# Patient Record
Sex: MALE | Race: WHITE | NOT HISPANIC OR LATINO | Employment: OTHER | URBAN - METROPOLITAN AREA
[De-identification: names, ages, dates, MRNs, and addresses within clinical notes are randomized per-mention and may not be internally consistent; named-entity substitution may affect disease eponyms.]

---

## 2023-07-14 LAB
CHOLESTEROL (MG/DL) IN SER/PLAS: 105 MG/DL (ref 0–199)
CHOLESTEROL IN HDL (MG/DL) IN SER/PLAS: 41.6 MG/DL
CHOLESTEROL/HDL RATIO: 2.5
LDL: 51 MG/DL (ref 0–99)
TRIGLYCERIDE (MG/DL) IN SER/PLAS: 63 MG/DL (ref 0–149)
VLDL: 13 MG/DL (ref 0–40)

## 2023-12-13 ENCOUNTER — APPOINTMENT (OUTPATIENT)
Dept: CARDIOLOGY | Facility: CLINIC | Age: 68
End: 2023-12-13
Payer: MEDICARE

## 2024-01-09 PROBLEM — R07.89 CHEST PAIN, ATYPICAL: Status: ACTIVE | Noted: 2024-01-09

## 2024-01-09 PROBLEM — E78.5 HYPERLIPIDEMIA: Status: ACTIVE | Noted: 2024-01-09

## 2024-01-09 PROBLEM — H90.3 ASYMMETRICAL SENSORINEURAL HEARING LOSS: Status: ACTIVE | Noted: 2024-01-09

## 2024-01-09 PROBLEM — I25.10 CORONARY ARTERY CALCIFICATION: Status: ACTIVE | Noted: 2024-01-09

## 2024-01-09 PROBLEM — N62 GYNECOMASTIA: Status: ACTIVE | Noted: 2024-01-09

## 2024-01-09 PROBLEM — R91.1 LUNG NODULE: Status: ACTIVE | Noted: 2024-01-09

## 2024-01-09 PROBLEM — K63.5 COLON POLYPS: Status: ACTIVE | Noted: 2024-01-09

## 2024-01-09 PROBLEM — I25.84 CORONARY ARTERY CALCIFICATION: Status: ACTIVE | Noted: 2024-01-09

## 2024-01-09 PROBLEM — R00.2 PALPITATIONS: Status: ACTIVE | Noted: 2024-01-09

## 2024-01-09 PROBLEM — R42 EPISODIC LIGHTHEADEDNESS: Status: ACTIVE | Noted: 2024-01-09

## 2024-01-09 PROBLEM — I10 ESSENTIAL HYPERTENSION: Status: ACTIVE | Noted: 2024-01-09

## 2024-01-09 PROBLEM — H93.12 TINNITUS OF LEFT EAR: Status: ACTIVE | Noted: 2024-01-09

## 2024-01-17 ENCOUNTER — APPOINTMENT (OUTPATIENT)
Dept: CARDIOLOGY | Facility: CLINIC | Age: 69
End: 2024-01-17
Payer: MEDICARE

## 2024-02-07 ENCOUNTER — APPOINTMENT (OUTPATIENT)
Dept: CARDIOLOGY | Facility: CLINIC | Age: 69
End: 2024-02-07
Payer: MEDICARE

## 2024-02-07 RX ORDER — LEVOTHYROXINE SODIUM 50 UG/1
50 TABLET ORAL DAILY
COMMUNITY
Start: 2016-11-03

## 2024-02-07 RX ORDER — VITAMIN B COMPLEX
CAPSULE ORAL
COMMUNITY
Start: 2022-03-10

## 2024-02-07 RX ORDER — NEOMYCIN SULFATE, POLYMYXIN B SULFATE, HYDROCORTISONE 3.5; 10000; 1 MG/ML; [USP'U]/ML; MG/ML
3 SOLUTION/ DROPS AURICULAR (OTIC) EVERY 6 HOURS
COMMUNITY
Start: 2022-08-17

## 2024-02-07 RX ORDER — METHYLPREDNISOLONE 4 MG/1
4 TABLET ORAL ONCE
COMMUNITY
Start: 2023-12-01 | End: 2024-03-13 | Stop reason: WASHOUT

## 2024-02-07 RX ORDER — ROSUVASTATIN CALCIUM 20 MG/1
10 TABLET, COATED ORAL
COMMUNITY
Start: 2019-01-24

## 2024-02-07 RX ORDER — ASCORBIC ACID 500 MG
500 TABLET ORAL DAILY
COMMUNITY
Start: 2022-03-10

## 2024-02-07 RX ORDER — LISINOPRIL 20 MG/1
1 TABLET ORAL DAILY
COMMUNITY
Start: 2016-03-11

## 2024-02-07 RX ORDER — AMLODIPINE BESYLATE 10 MG/1
10 TABLET ORAL
COMMUNITY
Start: 2015-03-30

## 2024-02-07 RX ORDER — LUTEIN 6 MG
TABLET ORAL
COMMUNITY
Start: 2022-03-10

## 2024-02-07 RX ORDER — ASPIRIN 81 MG/1
81 TABLET ORAL
COMMUNITY
Start: 2021-05-13

## 2024-03-07 PROBLEM — E66.3 OVERWEIGHT: Status: ACTIVE | Noted: 2024-03-07

## 2024-03-07 PROBLEM — R06.02 SHORTNESS OF BREATH: Status: ACTIVE | Noted: 2024-03-07

## 2024-03-07 PROBLEM — H61.20 IMPACTED CERUMEN: Status: ACTIVE | Noted: 2024-03-07

## 2024-03-13 ENCOUNTER — OFFICE VISIT (OUTPATIENT)
Dept: CARDIOLOGY | Facility: CLINIC | Age: 69
End: 2024-03-13
Payer: MEDICARE

## 2024-03-13 VITALS
DIASTOLIC BLOOD PRESSURE: 62 MMHG | WEIGHT: 210 LBS | BODY MASS INDEX: 32.89 KG/M2 | HEART RATE: 76 BPM | SYSTOLIC BLOOD PRESSURE: 105 MMHG | OXYGEN SATURATION: 95 %

## 2024-03-13 DIAGNOSIS — I10 ESSENTIAL HYPERTENSION: ICD-10-CM

## 2024-03-13 DIAGNOSIS — E78.00 PURE HYPERCHOLESTEROLEMIA: ICD-10-CM

## 2024-03-13 DIAGNOSIS — I25.10 CORONARY ARTERY CALCIFICATION: Primary | ICD-10-CM

## 2024-03-13 DIAGNOSIS — I25.84 CORONARY ARTERY CALCIFICATION: Primary | ICD-10-CM

## 2024-03-13 PROCEDURE — 99213 OFFICE O/P EST LOW 20 MIN: CPT | Performed by: INTERNAL MEDICINE

## 2024-03-13 PROCEDURE — 3078F DIAST BP <80 MM HG: CPT | Performed by: INTERNAL MEDICINE

## 2024-03-13 PROCEDURE — 1036F TOBACCO NON-USER: CPT | Performed by: INTERNAL MEDICINE

## 2024-03-13 PROCEDURE — 3074F SYST BP LT 130 MM HG: CPT | Performed by: INTERNAL MEDICINE

## 2024-03-13 PROCEDURE — 1160F RVW MEDS BY RX/DR IN RCRD: CPT | Performed by: INTERNAL MEDICINE

## 2024-03-13 PROCEDURE — 1159F MED LIST DOCD IN RCRD: CPT | Performed by: INTERNAL MEDICINE

## 2024-03-13 NOTE — PROGRESS NOTES
Chief Complaint:   Follow-up (Patient is here for a 6 month follow up)     History Of Present Illness:    Hawk Andrews is a 69 y.o. male presenting with cardiovascular disease.   CP  has resolved.  Patient denies SOB/palpitations/dizziness/lightheadedness/edema/claudication  Active=walks/runs    Last Recorded Vitals:  Vitals:    03/13/24 1006 03/13/24 1038   BP: 129/77 105/62   BP Location: Right arm    Patient Position: Sitting    BP Cuff Size: Adult    Pulse: 76    SpO2: 95%    Weight: 95.3 kg (210 lb)             Allergies:  Patient has no known allergies.    Outpatient Medications:  Current Outpatient Medications   Medication Instructions    amLODIPine (NORVASC) 10 mg, oral, Daily RT    ascorbic acid (VITAMIN C) 500 mg, oral, Daily    aspirin 81 mg, oral, Daily RT    b complex vitamins capsule oral    levothyroxine (SYNTHROID) 50 mcg, oral, Daily    lisinopril 20 mg tablet 1 tablet, oral, Daily    neomycin-polymyxin-HC (Cortisporin) otic solution 3 drops, Each Ear, Every 6 hours    rosuvastatin (CRESTOR) 10 mg, oral, Daily RT    vitamin E acid succinate (vitamin E succinate) 268 mg (400 unit) tablet oral       Physical Exam:  Constitutional:       Appearance: Healthy appearance. Overweight and not in distress.   Neck:      Vascular: No JVR. JVD normal.   Pulmonary:      Effort: Pulmonary effort is normal.      Breath sounds: Normal breath sounds. No wheezing. No rhonchi. No rales.   Chest:      Chest wall: Not tender to palpatation.   Cardiovascular:      PMI at left midclavicular line. Normal rate. Regular rhythm. Normal S1. Normal S2.       Murmurs: There is no murmur.      No gallop.  No click. No rub.   Pulses:     Intact distal pulses.   Edema:     Peripheral edema absent.   Abdominal:      General: Bowel sounds are normal.      Palpations: Abdomen is soft.      Tenderness: There is no abdominal tenderness.   Musculoskeletal: Normal range of motion.         General: No tenderness. Skin:     General:  "Skin is warm and dry.   Neurological:      General: No focal deficit present.      Mental Status: Alert and oriented to person, place and time.          Last Labs:  CBC -  No results found for: \"WBC\", \"HGB\", \"HCT\", \"MCV\", \"PLT\"    CMP -  Lab Results   Component Value Date    CALCIUM 9.1 08/30/2022       LIPID PANEL -   Lab Results   Component Value Date    CHOL 105 07/14/2023    TRIG 63 07/14/2023    HDL 41.6 07/14/2023    CHHDL 2.5 07/14/2023    LDLF 51 07/14/2023    VLDL 13 07/14/2023       RENAL FUNCTION PANEL -   Lab Results   Component Value Date    GLUCOSE 102 (H) 08/30/2022     08/30/2022    K 4.1 08/30/2022     08/30/2022    CO2 23 08/30/2022    ANIONGAP 14 08/30/2022    BUN 15 08/30/2022    CREATININE 0.76 08/30/2022    GFRMALE >90 08/30/2022    CALCIUM 9.1 08/30/2022        No results found for: \"BNP\", \"HGBA1C\"        Lab review: I have personally reviewed the laboratory result(s)       Problem List Items Addressed This Visit       Coronary artery calcification - Primary    Overview     CAC = 454 Agatston units units 1/2019   No current angina  9/2023 stress test NL  On ASA / statin           Essential hypertension    Overview     BP on low side -astmptomatic  8/2022 BMP OK           Hyperlipidemia    Overview     On HI statin in light of calcium score   7/2023 lipids good   Follow            Take 81 mg aspirin daily  Weight loss    Robin Farnsworth, DO  "

## 2024-04-03 ENCOUNTER — OFFICE VISIT (OUTPATIENT)
Dept: PRIMARY CARE | Facility: CLINIC | Age: 69
End: 2024-04-03
Payer: MEDICARE

## 2024-04-03 ENCOUNTER — LAB (OUTPATIENT)
Dept: LAB | Facility: LAB | Age: 69
End: 2024-04-03
Payer: MEDICARE

## 2024-04-03 VITALS
SYSTOLIC BLOOD PRESSURE: 130 MMHG | DIASTOLIC BLOOD PRESSURE: 72 MMHG | HEART RATE: 76 BPM | WEIGHT: 211.9 LBS | BODY MASS INDEX: 32.12 KG/M2 | HEIGHT: 68 IN

## 2024-04-03 DIAGNOSIS — Z23 IMMUNIZATION DUE: ICD-10-CM

## 2024-04-03 DIAGNOSIS — E66.09 CLASS 1 OBESITY DUE TO EXCESS CALORIES WITH SERIOUS COMORBIDITY AND BODY MASS INDEX (BMI) OF 32.0 TO 32.9 IN ADULT: ICD-10-CM

## 2024-04-03 DIAGNOSIS — I25.84 CORONARY ARTERY CALCIFICATION: ICD-10-CM

## 2024-04-03 DIAGNOSIS — Z00.00 ROUTINE GENERAL MEDICAL EXAMINATION AT A HEALTH CARE FACILITY: ICD-10-CM

## 2024-04-03 DIAGNOSIS — Z00.00 ROUTINE GENERAL MEDICAL EXAMINATION AT A HEALTH CARE FACILITY: Primary | ICD-10-CM

## 2024-04-03 DIAGNOSIS — I25.10 CORONARY ARTERY CALCIFICATION: ICD-10-CM

## 2024-04-03 PROBLEM — E66.811 CLASS 1 OBESITY DUE TO EXCESS CALORIES WITH SERIOUS COMORBIDITY AND BODY MASS INDEX (BMI) OF 32.0 TO 32.9 IN ADULT: Status: ACTIVE | Noted: 2024-04-03

## 2024-04-03 LAB
ALBUMIN SERPL BCP-MCNC: 4.1 G/DL (ref 3.4–5)
ALP SERPL-CCNC: 68 U/L (ref 33–136)
ALT SERPL W P-5'-P-CCNC: 20 U/L (ref 10–52)
ANION GAP SERPL CALC-SCNC: 15 MMOL/L (ref 10–20)
AST SERPL W P-5'-P-CCNC: 22 U/L (ref 9–39)
BILIRUB SERPL-MCNC: 0.7 MG/DL (ref 0–1.2)
BUN SERPL-MCNC: 16 MG/DL (ref 6–23)
CALCIUM SERPL-MCNC: 10 MG/DL (ref 8.6–10.6)
CHLORIDE SERPL-SCNC: 102 MMOL/L (ref 98–107)
CO2 SERPL-SCNC: 28 MMOL/L (ref 21–32)
CREAT SERPL-MCNC: 0.83 MG/DL (ref 0.5–1.3)
EGFRCR SERPLBLD CKD-EPI 2021: >90 ML/MIN/1.73M*2
ERYTHROCYTE [DISTWIDTH] IN BLOOD BY AUTOMATED COUNT: 13.5 % (ref 11.5–14.5)
EST. AVERAGE GLUCOSE BLD GHB EST-MCNC: 100 MG/DL
GLUCOSE SERPL-MCNC: 91 MG/DL (ref 74–99)
HBA1C MFR BLD: 5.1 %
HCT VFR BLD AUTO: 47.7 % (ref 41–52)
HGB BLD-MCNC: 15.4 G/DL (ref 13.5–17.5)
MCH RBC QN AUTO: 30.1 PG (ref 26–34)
MCHC RBC AUTO-ENTMCNC: 32.3 G/DL (ref 32–36)
MCV RBC AUTO: 93 FL (ref 80–100)
NRBC BLD-RTO: 0 /100 WBCS (ref 0–0)
PLATELET # BLD AUTO: 204 X10*3/UL (ref 150–450)
POTASSIUM SERPL-SCNC: 5.1 MMOL/L (ref 3.5–5.3)
PROT SERPL-MCNC: 6.9 G/DL (ref 6.4–8.2)
PSA SERPL-MCNC: 0.47 NG/ML
RBC # BLD AUTO: 5.11 X10*6/UL (ref 4.5–5.9)
SODIUM SERPL-SCNC: 140 MMOL/L (ref 136–145)
T4 FREE SERPL-MCNC: 1.14 NG/DL (ref 0.78–1.48)
TSH SERPL-ACNC: 4.21 MIU/L (ref 0.44–3.98)
WBC # BLD AUTO: 8.6 X10*3/UL (ref 4.4–11.3)

## 2024-04-03 PROCEDURE — 1160F RVW MEDS BY RX/DR IN RCRD: CPT | Performed by: INTERNAL MEDICINE

## 2024-04-03 PROCEDURE — 3075F SYST BP GE 130 - 139MM HG: CPT | Performed by: INTERNAL MEDICINE

## 2024-04-03 PROCEDURE — 84153 ASSAY OF PSA TOTAL: CPT

## 2024-04-03 PROCEDURE — 90471 IMMUNIZATION ADMIN: CPT | Performed by: INTERNAL MEDICINE

## 2024-04-03 PROCEDURE — 84443 ASSAY THYROID STIM HORMONE: CPT

## 2024-04-03 PROCEDURE — 83036 HEMOGLOBIN GLYCOSYLATED A1C: CPT

## 2024-04-03 PROCEDURE — 3008F BODY MASS INDEX DOCD: CPT | Performed by: INTERNAL MEDICINE

## 2024-04-03 PROCEDURE — 85027 COMPLETE CBC AUTOMATED: CPT

## 2024-04-03 PROCEDURE — 90715 TDAP VACCINE 7 YRS/> IM: CPT | Performed by: INTERNAL MEDICINE

## 2024-04-03 PROCEDURE — 99397 PER PM REEVAL EST PAT 65+ YR: CPT | Performed by: INTERNAL MEDICINE

## 2024-04-03 PROCEDURE — 80053 COMPREHEN METABOLIC PANEL: CPT

## 2024-04-03 PROCEDURE — 84439 ASSAY OF FREE THYROXINE: CPT

## 2024-04-03 PROCEDURE — 1159F MED LIST DOCD IN RCRD: CPT | Performed by: INTERNAL MEDICINE

## 2024-04-03 PROCEDURE — 3078F DIAST BP <80 MM HG: CPT | Performed by: INTERNAL MEDICINE

## 2024-04-03 PROCEDURE — 36415 COLL VENOUS BLD VENIPUNCTURE: CPT

## 2024-04-03 ASSESSMENT — ENCOUNTER SYMPTOMS
DIZZINESS: 0
SHORTNESS OF BREATH: 0
FATIGUE: 0
LOSS OF SENSATION IN FEET: 0
DEPRESSION: 0
OCCASIONAL FEELINGS OF UNSTEADINESS: 0
LIGHT-HEADEDNESS: 0
SLEEP DISTURBANCE: 0

## 2024-04-03 NOTE — PROGRESS NOTES
"Subjective   Patient ID: Hawk Andrews is a 69 y.o. male who presents for No chief complaint on file..    Here to get established after last PCP retired. [facility] Retired from the  in 2013 after working as an .    PMH:  HTN: On amlodipine and lisinopril.  Coronary artery calcification: Follows with cardiology. Had a stress test done that was negative. On ASA and statin. Had stopped taking a baby ASA, but says his cardiologist (Dr. Farnsworth) recommended he resume it recently.    Tries to exercise daily by stretching. Will go on walks, but plans to start biking this summer. Has gained 5-10 lbs over the last year. Doesn't smoke. Drinks beer/wine here and there. Eats non-sugary cereal daily, will sometimes skip lunch, and eats dinner before 6. Mostly just drinks water, with a ginger ale once every week or two or an occasional glass of milk (outside of having it with his morning cereal). Will eat burgers, hot dogs, meatloaf, veggies, a lot of salads (uses oil/vinegar instead of dressings). Likes to grill in the summer.        Review of Systems   Constitutional:  Negative for fatigue.   Respiratory:  Negative for shortness of breath.    Cardiovascular:  Negative for chest pain.   Neurological:  Negative for dizziness and light-headedness.   Psychiatric/Behavioral:  Negative for sleep disturbance.        /72 (BP Location: Right arm, Patient Position: Sitting, BP Cuff Size: Adult)   Pulse 76   Ht 1.73 m (5' 8.11\")   Wt 96.1 kg (211 lb 14.4 oz)   BMI 32.12 kg/m²   Objective   Physical Exam  Constitutional:       General: He is not in acute distress.     Appearance: He is obese. He is not ill-appearing, toxic-appearing or diaphoretic.   HENT:      Head: Normocephalic and atraumatic.   Eyes:      General: No scleral icterus.     Conjunctiva/sclera: Conjunctivae normal.   Cardiovascular:      Rate and Rhythm: Normal rate and regular rhythm.      Heart sounds: No murmur " heard.     No friction rub. No gallop.   Pulmonary:      Effort: Pulmonary effort is normal. No respiratory distress.      Breath sounds: No stridor. No wheezing, rhonchi or rales.   Abdominal:      General: Abdomen is flat. Bowel sounds are normal. There is no distension.      Palpations: Abdomen is soft.      Tenderness: There is no abdominal tenderness. There is no guarding.   Musculoskeletal:      Cervical back: Normal range of motion and neck supple. No tenderness.      Right lower leg: No edema.      Left lower leg: No edema.   Lymphadenopathy:      Cervical: No cervical adenopathy.   Skin:     General: Skin is warm and dry.   Neurological:      Mental Status: He is alert.         Assessment/Plan   Problem List Items Addressed This Visit             ICD-10-CM    Coronary artery calcification I25.10, I25.84    Relevant Orders    CBC    Class 1 obesity due to excess calories with serious comorbidity and body mass index (BMI) of 32.0 to 32.9 in adult E66.09, Z68.32     -Reviewed dietary habits. Recommended to start calorie counting to see if portion size could be the issue. Pt will try this and follow up at next appt.  -Already doing daily stretches and spends time doing water aerobics. Will start exercising/biking more as temperature improves.          Other Visit Diagnoses         Codes    Routine general medical examination at a health care facility    -  Primary Z00.00    Relevant Orders    Comprehensive metabolic panel    PSA    Hemoglobin A1c    Tsh With Reflex To Free T4 If Abnormal    Immunization due     Z23    Relevant Orders    Tdap vaccine, age 7 years and older (Completed)        -Labwork as ordered above.  -[facility].  -TDAP today. To get pneumovax at next appt.  -MAW next month.         Haritha Arredondo MD 04/03/24 12:00 PM

## 2024-04-03 NOTE — ASSESSMENT & PLAN NOTE
-Reviewed dietary habits. Recommended to start calorie counting to see if portion size could be the issue. Pt will try this and follow up at next appt.  -Already doing daily stretches and spends time doing water aerobics. Will start exercising/biking more as temperature improves.

## 2024-05-07 ENCOUNTER — APPOINTMENT (OUTPATIENT)
Dept: PRIMARY CARE | Facility: CLINIC | Age: 69
End: 2024-05-07
Payer: MEDICARE

## 2024-05-08 ENCOUNTER — APPOINTMENT (OUTPATIENT)
Dept: PRIMARY CARE | Facility: CLINIC | Age: 69
End: 2024-05-08
Payer: MEDICARE

## 2024-05-21 ENCOUNTER — APPOINTMENT (OUTPATIENT)
Dept: PRIMARY CARE | Facility: CLINIC | Age: 69
End: 2024-05-21
Payer: MEDICARE

## 2024-06-04 ENCOUNTER — APPOINTMENT (OUTPATIENT)
Dept: PRIMARY CARE | Facility: CLINIC | Age: 69
End: 2024-06-04
Payer: MEDICARE

## 2024-06-25 ENCOUNTER — APPOINTMENT (OUTPATIENT)
Dept: PRIMARY CARE | Facility: CLINIC | Age: 69
End: 2024-06-25
Payer: MEDICARE

## 2024-07-16 ENCOUNTER — APPOINTMENT (OUTPATIENT)
Dept: PRIMARY CARE | Facility: CLINIC | Age: 69
End: 2024-07-16
Payer: MEDICARE

## 2024-08-07 ENCOUNTER — APPOINTMENT (OUTPATIENT)
Dept: PRIMARY CARE | Facility: CLINIC | Age: 69
End: 2024-08-07
Payer: MEDICARE

## 2024-08-21 ENCOUNTER — APPOINTMENT (OUTPATIENT)
Dept: PRIMARY CARE | Facility: CLINIC | Age: 69
End: 2024-08-21
Payer: MEDICARE

## 2024-08-21 VITALS
HEIGHT: 68 IN | WEIGHT: 199 LBS | HEART RATE: 73 BPM | OXYGEN SATURATION: 92 % | SYSTOLIC BLOOD PRESSURE: 118 MMHG | BODY MASS INDEX: 30.16 KG/M2 | DIASTOLIC BLOOD PRESSURE: 76 MMHG

## 2024-08-21 DIAGNOSIS — Z00.00 MEDICARE ANNUAL WELLNESS VISIT, SUBSEQUENT: Primary | ICD-10-CM

## 2024-08-21 DIAGNOSIS — Z71.89 GOALS OF CARE, COUNSELING/DISCUSSION: ICD-10-CM

## 2024-08-21 PROCEDURE — 1036F TOBACCO NON-USER: CPT | Performed by: INTERNAL MEDICINE

## 2024-08-21 PROCEDURE — 1158F ADVNC CARE PLAN TLK DOCD: CPT | Performed by: INTERNAL MEDICINE

## 2024-08-21 PROCEDURE — 3008F BODY MASS INDEX DOCD: CPT | Performed by: INTERNAL MEDICINE

## 2024-08-21 PROCEDURE — G0439 PPPS, SUBSEQ VISIT: HCPCS | Performed by: INTERNAL MEDICINE

## 2024-08-21 PROCEDURE — 1123F ACP DISCUSS/DSCN MKR DOCD: CPT | Performed by: INTERNAL MEDICINE

## 2024-08-21 PROCEDURE — G0009 ADMIN PNEUMOCOCCAL VACCINE: HCPCS | Performed by: INTERNAL MEDICINE

## 2024-08-21 PROCEDURE — 1170F FXNL STATUS ASSESSED: CPT | Performed by: INTERNAL MEDICINE

## 2024-08-21 PROCEDURE — 3078F DIAST BP <80 MM HG: CPT | Performed by: INTERNAL MEDICINE

## 2024-08-21 PROCEDURE — 3074F SYST BP LT 130 MM HG: CPT | Performed by: INTERNAL MEDICINE

## 2024-08-21 PROCEDURE — 1160F RVW MEDS BY RX/DR IN RCRD: CPT | Performed by: INTERNAL MEDICINE

## 2024-08-21 PROCEDURE — 90677 PCV20 VACCINE IM: CPT | Performed by: INTERNAL MEDICINE

## 2024-08-21 PROCEDURE — 1159F MED LIST DOCD IN RCRD: CPT | Performed by: INTERNAL MEDICINE

## 2024-08-21 ASSESSMENT — ACTIVITIES OF DAILY LIVING (ADL)
DOING_HOUSEWORK: INDEPENDENT
MANAGING_FINANCES: INDEPENDENT
DRESSING: INDEPENDENT
BATHING: INDEPENDENT
GROCERY_SHOPPING: INDEPENDENT
TAKING_MEDICATION: INDEPENDENT

## 2024-08-21 ASSESSMENT — ENCOUNTER SYMPTOMS: UNEXPECTED WEIGHT CHANGE: 0

## 2024-08-21 ASSESSMENT — PATIENT HEALTH QUESTIONNAIRE - PHQ9
SUM OF ALL RESPONSES TO PHQ9 QUESTIONS 1 AND 2: 0
2. FEELING DOWN, DEPRESSED OR HOPELESS: NOT AT ALL
1. LITTLE INTEREST OR PLEASURE IN DOING THINGS: NOT AT ALL

## 2024-08-21 NOTE — PROGRESS NOTES
"Subjective   Reason for Visit: Hawk Andrews is an 69 y.o. male here for a Medicare Wellness visit.     Past Medical, Surgical, and Family History reviewed and updated in chart.    Reviewed all medications by prescribing practitioner or clinical pharmacist (such as prescriptions, OTCs, herbal therapies and supplements) and documented in the medical record.    Has lost around 12 lbs since last appt. Has made dietary changes to help facilitate this.        Patient Care Team:  Haritha Arredondo MD as PCP - General (Internal Medicine)  Robin Farnsworth DO as PCP - [facility] Attributed Provider  Robin Farnsworth DO as Consulting Physician (Cardiology)     Review of Systems   Constitutional:  Negative for unexpected weight change.       Objective   Vitals:  /76 (BP Location: Right arm, Patient Position: Sitting)   Pulse 73   Ht 1.727 m (5' 8\")   Wt 90.3 kg (199 lb)   SpO2 92%   BMI 30.26 kg/m²       Physical Exam  Constitutional:       General: He is not in acute distress.     Appearance: He is obese. He is not ill-appearing, toxic-appearing or diaphoretic.   HENT:      Head: Normocephalic and atraumatic.   Eyes:      Conjunctiva/sclera: Conjunctivae normal.   Neurological:      Mental Status: He is alert.         Assessment/Plan   Problem List Items Addressed This Visit    None  Visit Diagnoses         Codes    Medicare annual wellness visit, subsequent    -  Primary Z00.00    Goals of care, counseling/discussion     Z71.89        -Pneumovax given today.    Advance Directives Discussion  Advanced Care Planning (including a Living Will, Healthcare POA, as well as specific end of life choices and/or directives), was discussed with the patient and/or surrogate, voluntarily, and details of that discussion documented in the Problem List (under Advanced Directives Discussion) of the medical record.  Pt wants to be full code. Has no spouse, children, or parents. Has a nephew that could be his HCPOA; will speak to " him to ensure he is OK with it. Nephew lives out of state and pt doesn't have his contact information since phone isn't on him at todays appt. Will follow up at next visit.   (~16 min spent discussing above)

## 2024-09-04 PROBLEM — E66.9 CLASS 1 OBESITY WITH SERIOUS COMORBIDITY AND BODY MASS INDEX (BMI) OF 30.0 TO 30.9 IN ADULT: Status: ACTIVE | Noted: 2024-04-03

## 2024-09-04 PROBLEM — E66.3 OVERWEIGHT: Status: RESOLVED | Noted: 2024-03-07 | Resolved: 2024-09-04

## 2024-09-24 ENCOUNTER — APPOINTMENT (OUTPATIENT)
Dept: CARDIOLOGY | Facility: CLINIC | Age: 69
End: 2024-09-24
Payer: MEDICARE

## 2024-11-18 ENCOUNTER — APPOINTMENT (OUTPATIENT)
Dept: CARDIOLOGY | Facility: CLINIC | Age: 69
End: 2024-11-18
Payer: MEDICARE

## 2024-11-20 ENCOUNTER — OFFICE VISIT (OUTPATIENT)
Dept: CARDIOLOGY | Facility: CLINIC | Age: 69
End: 2024-11-20
Payer: MEDICARE

## 2024-11-20 VITALS
SYSTOLIC BLOOD PRESSURE: 112 MMHG | HEIGHT: 69 IN | OXYGEN SATURATION: 96 % | WEIGHT: 202.8 LBS | DIASTOLIC BLOOD PRESSURE: 65 MMHG | BODY MASS INDEX: 30.04 KG/M2 | HEART RATE: 78 BPM

## 2024-11-20 DIAGNOSIS — I10 ESSENTIAL HYPERTENSION: ICD-10-CM

## 2024-11-20 DIAGNOSIS — E66.811 CLASS 1 OBESITY WITH SERIOUS COMORBIDITY AND BODY MASS INDEX (BMI) OF 30.0 TO 30.9 IN ADULT, UNSPECIFIED OBESITY TYPE: ICD-10-CM

## 2024-11-20 DIAGNOSIS — I25.10 CORONARY ARTERY CALCIFICATION: ICD-10-CM

## 2024-11-20 DIAGNOSIS — R00.2 PALPITATIONS: Primary | ICD-10-CM

## 2024-11-20 DIAGNOSIS — E78.00 PURE HYPERCHOLESTEROLEMIA: ICD-10-CM

## 2024-11-20 PROCEDURE — 99417 PROLNG OP E/M EACH 15 MIN: CPT | Performed by: INTERNAL MEDICINE

## 2024-11-20 PROCEDURE — 99213 OFFICE O/P EST LOW 20 MIN: CPT | Performed by: INTERNAL MEDICINE

## 2024-11-20 PROCEDURE — 93005 ELECTROCARDIOGRAM TRACING: CPT | Performed by: INTERNAL MEDICINE

## 2024-11-20 PROCEDURE — 1159F MED LIST DOCD IN RCRD: CPT | Performed by: INTERNAL MEDICINE

## 2024-11-20 PROCEDURE — 1123F ACP DISCUSS/DSCN MKR DOCD: CPT | Performed by: INTERNAL MEDICINE

## 2024-11-20 PROCEDURE — 3078F DIAST BP <80 MM HG: CPT | Performed by: INTERNAL MEDICINE

## 2024-11-20 PROCEDURE — 3074F SYST BP LT 130 MM HG: CPT | Performed by: INTERNAL MEDICINE

## 2024-11-20 PROCEDURE — 3008F BODY MASS INDEX DOCD: CPT | Performed by: INTERNAL MEDICINE

## 2024-11-20 NOTE — PROGRESS NOTES
"Chief Complaint:   Follow-up (6 mo )     History Of Present Illness:    Hawk Andrews is a 69 y.o. male presenting with cardiovascular disease.    Patient denies  CP/SOB/palpitations/dizziness/lightheadedness/edema/claudication  Active=walks/jog/stretches    Last Recorded Vitals:  Vitals:    11/20/24 1120 11/20/24 1135   BP: 132/68 112/65   BP Location: Left arm    Patient Position: Sitting    BP Cuff Size: Adult    Pulse: 78    SpO2: 96%    Weight: 92 kg (202 lb 12.8 oz)    Height: 1.753 m (5' 9\")             Allergies:  Patient has no known allergies.    Outpatient Medications:  Current Outpatient Medications   Medication Instructions    amLODIPine (NORVASC) 10 mg, Daily RT    ascorbic acid (VITAMIN C) 500 mg, Daily    aspirin 81 mg, Daily RT    b complex vitamins capsule Take by mouth.    levothyroxine (SYNTHROID) 50 mcg, Daily    lisinopril 20 mg tablet 1 tablet, Daily    neomycin-polymyxin-HC (Cortisporin) otic solution 3 drops, Every 6 hours    rosuvastatin (CRESTOR) 10 mg, Daily RT    vitamin E acid succinate (vitamin E succinate) 268 mg (400 unit) tablet Take by mouth.       Physical Exam:  Constitutional:       Appearance: Healthy appearance. Overweight and not in distress.   Neck:      Vascular: No JVR. JVD normal.   Pulmonary:      Effort: Pulmonary effort is normal.      Breath sounds: Normal breath sounds. No wheezing. No rhonchi. No rales.   Chest:      Chest wall: Not tender to palpatation.   Cardiovascular:      PMI at left midclavicular line. Normal rate. Regular rhythm. Normal S1. Normal S2.       Murmurs: There is no murmur.      No gallop.  No click. No rub.   Pulses:     Intact distal pulses.   Edema:     Peripheral edema absent.   Abdominal:      General: Bowel sounds are normal.      Palpations: Abdomen is soft.      Tenderness: There is no abdominal tenderness.   Musculoskeletal: Normal range of motion.         General: No tenderness. Skin:     General: Skin is warm and dry. "   Neurological:      General: No focal deficit present.      Mental Status: Alert and oriented to person, place and time.          Last Labs:  CBC -  Lab Results   Component Value Date    WBC 8.6 04/03/2024    HGB 15.4 04/03/2024    HCT 47.7 04/03/2024    MCV 93 04/03/2024     04/03/2024       CMP -  Lab Results   Component Value Date    CALCIUM 10.0 04/03/2024    PROT 6.9 04/03/2024    ALBUMIN 4.1 04/03/2024    AST 22 04/03/2024    ALT 20 04/03/2024    ALKPHOS 68 04/03/2024    BILITOT 0.7 04/03/2024       LIPID PANEL -   Lab Results   Component Value Date    CHOL 105 07/14/2023    TRIG 63 07/14/2023    HDL 41.6 07/14/2023    CHHDL 2.5 07/14/2023    LDLF 51 07/14/2023    VLDL 13 07/14/2023       RENAL FUNCTION PANEL -   Lab Results   Component Value Date    GLUCOSE 91 04/03/2024     04/03/2024    K 5.1 04/03/2024     04/03/2024    CO2 28 04/03/2024    ANIONGAP 15 04/03/2024    BUN 16 04/03/2024    CREATININE 0.83 04/03/2024    GFRMALE >90 08/30/2022    CALCIUM 10.0 04/03/2024    ALBUMIN 4.1 04/03/2024        Lab Results   Component Value Date    HGBA1C 5.1 04/03/2024           Lab review: I have personally reviewed the laboratory result(s)       Problem List Items Addressed This Visit       Coronary artery calcification    Overview     CAC = 454 Agatston units units 1/2019   No current angina/ischemic EKG changes  9/2023 stress test NL  On ASA / statin           Relevant Orders    ECG 12 lead (Clinic Performed)    Essential hypertension    Overview     BP on low side -astmptomatic  4/2024 BMP OK           Hyperlipidemia    Overview     On HI statin in light of calcium score   7/2023 lipids good   Recheck         Palpitations - Primary    Relevant Orders    ECG 12 lead (Clinic Performed)    Class 1 obesity with serious comorbidity and body mass index (BMI) of 30.0 to 30.9 in adult    Overview     Needs some weight loss.            Lipids  Weight loss  Stay active    Robin Farnsworth DO

## 2024-11-21 LAB
ATRIAL RATE: 72 BPM
P AXIS: 58 DEGREES
P OFFSET: 192 MS
P ONSET: 141 MS
PR INTERVAL: 156 MS
Q ONSET: 219 MS
QRS COUNT: 11 BEATS
QRS DURATION: 88 MS
QT INTERVAL: 404 MS
QTC CALCULATION(BAZETT): 442 MS
QTC FREDERICIA: 429 MS
R AXIS: 11 DEGREES
T AXIS: 17 DEGREES
T OFFSET: 421 MS
VENTRICULAR RATE: 72 BPM

## 2025-02-25 ENCOUNTER — APPOINTMENT (OUTPATIENT)
Dept: PRIMARY CARE | Facility: CLINIC | Age: 70
End: 2025-02-25
Payer: MEDICARE

## 2025-03-25 ENCOUNTER — APPOINTMENT (OUTPATIENT)
Dept: PRIMARY CARE | Facility: CLINIC | Age: 70
End: 2025-03-25
Payer: MEDICARE

## 2025-04-15 ENCOUNTER — APPOINTMENT (OUTPATIENT)
Dept: PRIMARY CARE | Facility: CLINIC | Age: 70
End: 2025-04-15
Payer: MEDICARE

## 2025-04-15 VITALS
OXYGEN SATURATION: 95 % | HEART RATE: 65 BPM | BODY MASS INDEX: 30.16 KG/M2 | WEIGHT: 204.2 LBS | RESPIRATION RATE: 18 BRPM | DIASTOLIC BLOOD PRESSURE: 72 MMHG | SYSTOLIC BLOOD PRESSURE: 123 MMHG

## 2025-04-15 DIAGNOSIS — I10 PRIMARY HYPERTENSION: ICD-10-CM

## 2025-04-15 DIAGNOSIS — E03.9 HYPOTHYROIDISM, UNSPECIFIED TYPE: ICD-10-CM

## 2025-04-15 DIAGNOSIS — E78.5 DYSLIPIDEMIA: Primary | ICD-10-CM

## 2025-04-15 DIAGNOSIS — Z12.5 SCREENING FOR PROSTATE CANCER: ICD-10-CM

## 2025-04-15 PROCEDURE — 1159F MED LIST DOCD IN RCRD: CPT | Performed by: INTERNAL MEDICINE

## 2025-04-15 PROCEDURE — 3078F DIAST BP <80 MM HG: CPT | Performed by: INTERNAL MEDICINE

## 2025-04-15 PROCEDURE — 99212 OFFICE O/P EST SF 10 MIN: CPT | Performed by: INTERNAL MEDICINE

## 2025-04-15 PROCEDURE — 1123F ACP DISCUSS/DSCN MKR DOCD: CPT | Performed by: INTERNAL MEDICINE

## 2025-04-15 PROCEDURE — 3074F SYST BP LT 130 MM HG: CPT | Performed by: INTERNAL MEDICINE

## 2025-04-15 ASSESSMENT — ENCOUNTER SYMPTOMS: FREQUENCY: 1

## 2025-04-15 NOTE — PROGRESS NOTES
Subjective   Patient ID: Hawk Andrews is a 70 y.o. male who presents for Follow-up.    Pt states he overall is doing well.    Is wondering if CT cardiac score could be repeated. Is curious to see how much things have changed.        Review of Systems   Genitourinary:  Positive for frequency (overnight).       /72 (BP Location: Right arm, Patient Position: Sitting)   Pulse 65   Resp 18   Wt 92.6 kg (204 lb 3.2 oz)   SpO2 95%   BMI 30.16 kg/m²   Objective   Physical Exam  Constitutional:       General: He is not in acute distress.     Appearance: He is obese. He is not ill-appearing, toxic-appearing or diaphoretic.   HENT:      Head: Normocephalic and atraumatic.   Eyes:      Conjunctiva/sclera: Conjunctivae normal.   Neurological:      Mental Status: He is alert.         Assessment/Plan   Problem List Items Addressed This Visit    None  Visit Diagnoses         Codes    Dyslipidemia    -  Primary E78.5    Relevant Orders    Lipid panel    Primary hypertension     I10    Relevant Orders    Comprehensive metabolic panel    CBC    Nocturia     R35.1    Hypothyroidism, unspecified type     E03.9    Relevant Orders    Tsh With Reflex To Free T4 If Abnormal    Screening for prostate cancer     Z12.5    Relevant Orders    PSA        -Reviewed w/ pt why we should be checking lipids first before considering repeat CTC score. Will order rest of labwork for the year in the meantime, then see pt back in August for MAW. Pt agreeable and all questions answered.         Haritha Arredondo MD 04/15/25 3:03 PM

## 2025-05-07 LAB
ALBUMIN SERPL-MCNC: 4 G/DL (ref 3.6–5.1)
ALP SERPL-CCNC: 62 U/L (ref 35–144)
ALT SERPL-CCNC: 19 U/L (ref 9–46)
ANION GAP SERPL CALCULATED.4IONS-SCNC: 10 MMOL/L (CALC) (ref 7–17)
AST SERPL-CCNC: 20 U/L (ref 10–35)
BILIRUB SERPL-MCNC: 0.7 MG/DL (ref 0.2–1.2)
BUN SERPL-MCNC: 15 MG/DL (ref 7–25)
CALCIUM SERPL-MCNC: 9.9 MG/DL (ref 8.6–10.3)
CHLORIDE SERPL-SCNC: 101 MMOL/L (ref 98–110)
CHOLEST SERPL-MCNC: 120 MG/DL
CHOLEST/HDLC SERPL: 2.6 (CALC)
CO2 SERPL-SCNC: 26 MMOL/L (ref 20–32)
CREAT SERPL-MCNC: 0.91 MG/DL (ref 0.7–1.28)
EGFRCR SERPLBLD CKD-EPI 2021: 90 ML/MIN/1.73M2
ERYTHROCYTE [DISTWIDTH] IN BLOOD BY AUTOMATED COUNT: 17.7 % (ref 11–15)
GLUCOSE SERPL-MCNC: 114 MG/DL (ref 65–99)
HCT VFR BLD AUTO: 44.4 % (ref 38.5–50)
HDLC SERPL-MCNC: 46 MG/DL
HGB BLD-MCNC: 15.2 G/DL (ref 13.2–17.1)
LDLC SERPL CALC-MCNC: 58 MG/DL (CALC)
MCH RBC QN AUTO: 30.4 PG (ref 27–33)
MCHC RBC AUTO-ENTMCNC: 34.2 G/DL (ref 32–36)
MCV RBC AUTO: 88.8 FL (ref 80–100)
NONHDLC SERPL-MCNC: 74 MG/DL (CALC)
PLATELET # BLD AUTO: 196 THOUSAND/UL (ref 140–400)
PMV BLD REES-ECKER: 10.9 FL (ref 7.5–12.5)
POTASSIUM SERPL-SCNC: 4.3 MMOL/L (ref 3.5–5.3)
PROT SERPL-MCNC: 6.9 G/DL (ref 6.1–8.1)
PSA SERPL-MCNC: 0.91 NG/ML
RBC # BLD AUTO: 5 MILLION/UL (ref 4.2–5.8)
SODIUM SERPL-SCNC: 137 MMOL/L (ref 135–146)
T4 FREE SERPL-MCNC: 1.4 NG/DL (ref 0.8–1.8)
TRIGL SERPL-MCNC: 78 MG/DL
TSH SERPL-ACNC: 4.85 MIU/L (ref 0.4–4.5)
WBC # BLD AUTO: 5.8 THOUSAND/UL (ref 3.8–10.8)

## 2025-05-13 PROBLEM — H25.13 AGE-RELATED NUCLEAR CATARACT, BILATERAL: Status: ACTIVE | Noted: 2025-05-13

## 2025-05-13 PROBLEM — H90.3 ASYMMETRICAL SENSORINEURAL HEARING LOSS: Status: RESOLVED | Noted: 2024-01-09 | Resolved: 2025-05-13

## 2025-05-13 PROBLEM — R06.02 SHORTNESS OF BREATH: Status: RESOLVED | Noted: 2024-03-07 | Resolved: 2025-05-13

## 2025-05-13 PROBLEM — H61.20 IMPACTED CERUMEN: Status: RESOLVED | Noted: 2024-03-07 | Resolved: 2025-05-13

## 2025-05-13 PROBLEM — M54.50 CHRONIC LOW BACK PAIN: Status: ACTIVE | Noted: 2025-05-13

## 2025-05-13 PROBLEM — G89.29 CHRONIC LOW BACK PAIN: Status: ACTIVE | Noted: 2025-05-13

## 2025-05-13 PROBLEM — E03.9 HYPOTHYROIDISM: Status: ACTIVE | Noted: 2025-05-13

## 2025-05-27 ENCOUNTER — TELEPHONE (OUTPATIENT)
Dept: PRIMARY CARE | Facility: CLINIC | Age: 70
End: 2025-05-27
Payer: MEDICARE

## 2025-05-27 DIAGNOSIS — R73.01 ELEVATED FASTING BLOOD SUGAR: Primary | ICD-10-CM

## 2025-06-04 ENCOUNTER — OFFICE VISIT (OUTPATIENT)
Dept: CARDIOLOGY | Facility: CLINIC | Age: 70
End: 2025-06-04
Payer: MEDICARE

## 2025-06-04 VITALS
OXYGEN SATURATION: 95 % | WEIGHT: 218 LBS | DIASTOLIC BLOOD PRESSURE: 78 MMHG | HEART RATE: 66 BPM | SYSTOLIC BLOOD PRESSURE: 118 MMHG | BODY MASS INDEX: 32.19 KG/M2

## 2025-06-04 DIAGNOSIS — E78.00 PURE HYPERCHOLESTEROLEMIA: ICD-10-CM

## 2025-06-04 DIAGNOSIS — R07.89 OTHER CHEST PAIN: ICD-10-CM

## 2025-06-04 DIAGNOSIS — E66.811 CLASS 1 OBESITY WITH SERIOUS COMORBIDITY AND BODY MASS INDEX (BMI) OF 30.0 TO 30.9 IN ADULT, UNSPECIFIED OBESITY TYPE: ICD-10-CM

## 2025-06-04 DIAGNOSIS — I25.10 CORONARY ARTERY CALCIFICATION: Primary | ICD-10-CM

## 2025-06-04 DIAGNOSIS — I10 ESSENTIAL HYPERTENSION: ICD-10-CM

## 2025-06-04 PROCEDURE — 99213 OFFICE O/P EST LOW 20 MIN: CPT | Performed by: INTERNAL MEDICINE

## 2025-06-04 PROCEDURE — G2211 COMPLEX E/M VISIT ADD ON: HCPCS | Performed by: INTERNAL MEDICINE

## 2025-06-04 PROCEDURE — 99214 OFFICE O/P EST MOD 30 MIN: CPT | Performed by: INTERNAL MEDICINE

## 2025-06-04 PROCEDURE — 99212 OFFICE O/P EST SF 10 MIN: CPT | Performed by: INTERNAL MEDICINE

## 2025-06-04 PROCEDURE — 1160F RVW MEDS BY RX/DR IN RCRD: CPT | Performed by: INTERNAL MEDICINE

## 2025-06-04 PROCEDURE — 3074F SYST BP LT 130 MM HG: CPT | Performed by: INTERNAL MEDICINE

## 2025-06-04 PROCEDURE — 1036F TOBACCO NON-USER: CPT | Performed by: INTERNAL MEDICINE

## 2025-06-04 PROCEDURE — 3078F DIAST BP <80 MM HG: CPT | Performed by: INTERNAL MEDICINE

## 2025-06-04 PROCEDURE — 93005 ELECTROCARDIOGRAM TRACING: CPT | Performed by: INTERNAL MEDICINE

## 2025-06-04 PROCEDURE — 1159F MED LIST DOCD IN RCRD: CPT | Performed by: INTERNAL MEDICINE

## 2025-06-04 RX ORDER — FERROUS SULFATE 325(65) MG
325 TABLET ORAL
COMMUNITY
Start: 2025-02-12

## 2025-06-04 NOTE — PROGRESS NOTES
"Chief Complaint:   6 month follow up , Palpitations, and Hypertension     History Of Present Illness:    Hawk Andrews is a 70 y.o. male presenting with cardiovascular disease.  Has been having off/on chest discomfort.Described as \"burning\"-epigastric-often with /after activity  Some improvement with TUMS    Patient denies  SOB/palpitations/dizziness/lightheadedness/edema/claudication  Active=walks/jog/stretches    Last Recorded Vitals:  Vitals:    06/04/25 1142   BP: 118/78   BP Location: Right arm   Patient Position: Sitting   BP Cuff Size: Adult   Pulse: 66   SpO2: 95%   Weight: 98.9 kg (218 lb)            Allergies:  Patient has no known allergies.    Outpatient Medications:  Current Outpatient Medications   Medication Instructions    amLODIPine (NORVASC) 10 mg, Daily RT    ascorbic acid (VITAMIN C) 500 mg, Daily    aspirin 81 mg, Daily RT    b complex vitamins capsule Take by mouth.    ferrous sulfate 325 mg (65 mg elemental) tablet 325 mg of ferrous sulfate, Daily with breakfast    levothyroxine (SYNTHROID) 50 mcg, Daily    lisinopril 20 mg tablet 1 tablet, Daily    rosuvastatin (CRESTOR) 10 mg, Daily RT    vitamin E acid succinate (vitamin E succinate) 268 mg (400 unit) tablet Take by mouth.       Physical Exam:  Constitutional:       Appearance: Healthy appearance. Overweight and not in distress.   Neck:      Vascular: No JVR. JVD normal.   Pulmonary:      Effort: Pulmonary effort is normal.      Breath sounds: Normal breath sounds. No wheezing. No rhonchi. No rales.   Chest:      Chest wall: Not tender to palpatation.   Cardiovascular:      PMI at left midclavicular line. Normal rate. Regular rhythm. Normal S1. Normal S2.       Murmurs: There is no murmur.      No gallop.  No click. No rub.   Pulses:     Intact distal pulses.   Edema:     Peripheral edema absent.   Abdominal:      General: Bowel sounds are normal.      Palpations: Abdomen is soft.      Tenderness: There is no abdominal tenderness. "   Musculoskeletal: Normal range of motion.         General: No tenderness. Skin:     General: Skin is warm and dry.   Neurological:      General: No focal deficit present.      Mental Status: Alert and oriented to person, place and time.          Last Labs:  CBC -  Lab Results   Component Value Date    WBC 5.8 05/06/2025    HGB 15.2 05/06/2025    HCT 44.4 05/06/2025    MCV 88.8 05/06/2025     05/06/2025       CMP -  Lab Results   Component Value Date    CALCIUM 9.9 05/06/2025    PROT 6.9 05/06/2025    ALBUMIN 4.0 05/06/2025    AST 20 05/06/2025    ALT 19 05/06/2025    ALKPHOS 62 05/06/2025    BILITOT 0.7 05/06/2025       LIPID PANEL -   Lab Results   Component Value Date    CHOL 120 05/06/2025    TRIG 78 05/06/2025    HDL 46 05/06/2025    CHHDL 2.6 05/06/2025    LDLF 51 07/14/2023    VLDL 13 07/14/2023    NHDL 74 05/06/2025   LDL 58    RENAL FUNCTION PANEL -   Lab Results   Component Value Date    GLUCOSE 114 (H) 05/06/2025     05/06/2025    K 4.3 05/06/2025     05/06/2025    CO2 26 05/06/2025    ANIONGAP 10 05/06/2025    BUN 15 05/06/2025    CREATININE 0.91 05/06/2025    GFRMALE >90 08/30/2022    CALCIUM 9.9 05/06/2025    ALBUMIN 4.0 05/06/2025        Lab Results   Component Value Date    HGBA1C 5.1 04/03/2024           Lab review: I have personally reviewed the laboratory result(s)       Problem List Items Addressed This Visit       Coronary artery calcification - Primary    Overview   CAC = 454 Agatston units units 1/2019 9/2023 stress test NL  Patient reports recent burning epigastric discomfort-no acute ischemic EKG changes  Recheck stress test  On ASA / statin           Relevant Orders    ECG 12 lead (Clinic Performed)    Essential hypertension    Overview   BP stable on current meds  5/2025 BMP okay         Hyperlipidemia    Overview   On HI statin in light of calcium score   5/2025 lipids good   Follow heart healthy diet         Other chest pain    Overview   Patient with recent burning  epigastric discomfort  No acute ischemic EKG changes  Check stress test         Class 1 obesity with serious comorbidity and body mass index (BMI) of 30.0 to 30.9 in adult    Overview   Needs some weight loss.              Weight loss  Exercise nuclear stress test= chest pain/coronary calcification    Robin Farnsworth, DO

## 2025-06-06 LAB
ATRIAL RATE: 58 BPM
P AXIS: 53 DEGREES
P OFFSET: 186 MS
P ONSET: 141 MS
PR INTERVAL: 156 MS
Q ONSET: 219 MS
QRS COUNT: 10 BEATS
QRS DURATION: 90 MS
QT INTERVAL: 428 MS
QTC CALCULATION(BAZETT): 420 MS
QTC FREDERICIA: 423 MS
R AXIS: 28 DEGREES
T AXIS: 33 DEGREES
T OFFSET: 433 MS
VENTRICULAR RATE: 58 BPM

## 2025-06-06 NOTE — TELEPHONE ENCOUNTER
Asked if you can order a glucose too just for his peace of mind.   
LMOVM to call office  
Patient called concerned about Glucose from May 6th it was elevated, he was fasting, should he be worried, do you think he should recheck it?   
Patient informed.   
R groin infection, femoral artery stenosis

## 2025-07-02 ENCOUNTER — HOSPITAL ENCOUNTER (OUTPATIENT)
Dept: RADIOLOGY | Facility: HOSPITAL | Age: 70
Discharge: HOME | End: 2025-07-02
Payer: MEDICARE

## 2025-07-02 ENCOUNTER — HOSPITAL ENCOUNTER (OUTPATIENT)
Dept: CARDIOLOGY | Facility: HOSPITAL | Age: 70
Discharge: HOME | End: 2025-07-02
Payer: MEDICARE

## 2025-07-02 DIAGNOSIS — R07.89 OTHER CHEST PAIN: ICD-10-CM

## 2025-07-02 PROCEDURE — 93016 CV STRESS TEST SUPVJ ONLY: CPT | Performed by: INTERNAL MEDICINE

## 2025-07-02 PROCEDURE — 3430000001 HC RX 343 DIAGNOSTIC RADIOPHARMACEUTICALS: Performed by: INTERNAL MEDICINE

## 2025-07-02 PROCEDURE — A9502 TC99M TETROFOSMIN: HCPCS | Performed by: INTERNAL MEDICINE

## 2025-07-02 PROCEDURE — 93017 CV STRESS TEST TRACING ONLY: CPT

## 2025-07-02 PROCEDURE — 93018 CV STRESS TEST I&R ONLY: CPT | Performed by: INTERNAL MEDICINE

## 2025-07-02 PROCEDURE — 78452 HT MUSCLE IMAGE SPECT MULT: CPT | Performed by: INTERNAL MEDICINE

## 2025-07-02 PROCEDURE — 78452 HT MUSCLE IMAGE SPECT MULT: CPT

## 2025-07-02 RX ADMIN — TETROFOSMIN 36 MILLICURIE: 0.23 INJECTION, POWDER, LYOPHILIZED, FOR SOLUTION INTRAVENOUS at 12:05

## 2025-07-02 RX ADMIN — TETROFOSMIN 10.7 MILLICURIE: 0.23 INJECTION, POWDER, LYOPHILIZED, FOR SOLUTION INTRAVENOUS at 10:50

## 2025-07-07 PROBLEM — R07.9 CHEST PAIN, UNSPECIFIED: Status: ACTIVE | Noted: 2024-01-09

## 2025-07-23 ENCOUNTER — APPOINTMENT (OUTPATIENT)
Dept: CARDIOLOGY | Facility: CLINIC | Age: 70
End: 2025-07-23
Payer: MEDICARE

## 2025-08-19 ENCOUNTER — APPOINTMENT (OUTPATIENT)
Dept: PRIMARY CARE | Facility: CLINIC | Age: 70
End: 2025-08-19
Payer: MEDICARE

## 2025-09-24 ENCOUNTER — APPOINTMENT (OUTPATIENT)
Dept: PRIMARY CARE | Facility: CLINIC | Age: 70
End: 2025-09-24
Payer: MEDICARE

## 2025-09-30 ENCOUNTER — APPOINTMENT (OUTPATIENT)
Dept: CARDIOLOGY | Facility: CLINIC | Age: 70
End: 2025-09-30
Payer: MEDICARE